# Patient Record
Sex: FEMALE | Race: BLACK OR AFRICAN AMERICAN | NOT HISPANIC OR LATINO | Employment: FULL TIME | URBAN - METROPOLITAN AREA
[De-identification: names, ages, dates, MRNs, and addresses within clinical notes are randomized per-mention and may not be internally consistent; named-entity substitution may affect disease eponyms.]

---

## 2021-01-08 ENCOUNTER — ROUTINE PRENATAL (OUTPATIENT)
Dept: OBSTETRICS AND GYNECOLOGY | Facility: CLINIC | Age: 21
End: 2021-01-08

## 2021-01-08 VITALS
BODY MASS INDEX: 16.71 KG/M2 | DIASTOLIC BLOOD PRESSURE: 58 MMHG | SYSTOLIC BLOOD PRESSURE: 103 MMHG | WEIGHT: 104 LBS | HEIGHT: 66 IN

## 2021-01-08 DIAGNOSIS — Z34.90 PREGNANCY, UNSPECIFIED GESTATIONAL AGE: Primary | ICD-10-CM

## 2021-01-08 LAB
GLUCOSE UR STRIP-MCNC: NEGATIVE MG/DL
PROT UR STRIP-MCNC: NEGATIVE MG/DL

## 2021-01-08 PROCEDURE — 99213 OFFICE O/P EST LOW 20 MIN: CPT | Performed by: NURSE PRACTITIONER

## 2021-01-08 RX ORDER — PNV NO.95/FERROUS FUM/FOLIC AC 28MG-0.8MG
1 TABLET ORAL DAILY
COMMUNITY
Start: 2021-01-04 | End: 2021-02-03

## 2021-01-08 NOTE — PROGRESS NOTES
Initial ob visit     CC- Here for care of pregnancy     Rangel Ocampo is being seen today for her first obstetrical visit.  She is a 20 y.o.  [facility] denies complications with this pregnancy. Taking PNV daily. She has recently stopped smoking. LMP 20, GA 16+6 weeks, feeling flutters. FOB is involved. She reports she was positive for Covid 19 in 2020, states she received a phone call on Monday (21) [place] She states she has not come in sooner for pregnancy care because she was unaware that she was pregnant.    It was noted after pt visit was complete under Care Everywhere that she was actually diagnosed Covid + on 2021 and therefore she should currently be in quarantine.     # 1 - Date: 20, Sex: Male, Weight: None, GA: 39w0d, Delivery: Vaginal, Spontaneous, Apgar1: None, Apgar5: None, Living: None, Birth Comments: None    # 2 - Date: None, Sex: None, Weight: None, GA: None, Delivery: None, Apgar1: None, Apgar5: None, Living: None, Birth Comments: None      Current obstetric complaints : denies  Prior obstetric issues, potential pregnancy concerns: short interval between pregnancy  Family history of genetic issues (includes FOB): denies  Prior infections concerning in pregnancy (Rash, fever in last 2 weeks): Covid + 2021  Varicella Hx - denies  Prior testing for Cystic Fibrosis Carrier or Sickle Cell Trait- denies  Prepregnancy BMI - Body mass index is 16.79 kg/m².    Past Medical History:   Diagnosis Date   • Patient denies medical problems        Past Surgical History:   Procedure Laterality Date   • NO PAST SURGERIES           Current Outpatient Medications:   •  Prenatal Vit-Fe Fumarate-FA (prenatal vitamin 28-0.8) 28-0.8 MG tablet tablet, Take 1 tablet by mouth Daily., Disp: , Rfl:     No Known Allergies    Social hx:Single  FOB: Aristides Burgos    Family History   Problem Relation Age of Onset   • No Known Problems  "Father    • No Known Problems Mother    • Hypertension Maternal Uncle        Review of systems     Constitutional : positive nausea and vomiting   : Vaginal bleeding, cramping:denies  Breast Tenderness : positive  A comprehensive review of systems was negative.     Objective    /58   Ht 167.6 cm (66\")   Wt 47.2 kg (104 lb)   LMP 09/12/2020   BMI 16.79 kg/m²       General Appearance:    Alert, cooperative, in no acute distress   Head:    Normocephalic, without obvious abnormality, atraumatic   Eyes:            Lids and lashes normal, conjunctivae and sclerae normal, no   icterus, no pallor, corneas clear   Ears:    Ears appear intact with no abnormalities noted       Neck:   No adenopathy, supple, trachea midline, no thyromegaly   Back:     No kyphosis present, no scoliosis present,                       Lungs:     Clear to auscultation,respirations regular, even and     unlabored    Heart:    Regular rhythm and normal rate, normal S1 and S2, no            murmur, no gallop, no rub, no click   Breast Exam:    No masses, No nipple discharge   Abdomen:     Normal bowel sounds, no masses, no organomegaly, soft        non-tender, non-distended, no guarding, no rebound                 tenderness   Genitalia:    Vulva - BUS-WNL, NEFG    Vagina - No discharge, No bleeding    Cervix - No Lesions, closed     Uterus - Consistent with 16 weeks    Adnexa - No mass, NT    Pelvimetry - clinically adequate, gynecoid pelvis     Extremities:   Moves all extremities well, no edema, no cyanosis, no              redness   Pulses:   Pulses palpable and equal bilaterally   Skin:   No bleeding, bruising or rash   Lymph nodes:   No palpable adenopathy   Neurologic:   Sensation intact, A&O times 3      Assessment    1) Pregnancy at 16+6 weeks  Late onset Prenatal Care  Short interval between pregnancies  Prenatal panel, pap smear collected today  Encouraged daily PNV and folic acid  Anatomy scan at next appt in 2 weeks     " Plan    Initial labs collected  Ultrasound in 2 weeks  Cultures, both NuSwab and urine collected  Activity recommendation : 150 minutes/week of moderate intensity aerobic activity unless we limit for bleeding, hypertension or other pregnancy complication   Patient is on Prenatal vitamins  Problem list reviewed and updated.  Reviewed routine prenatal care with the office to include but not limited to   Zika (travel restrictions/ok to use insect repellant), not to changing cat litter, food restrictions, avoidance of alcohol, tobacco and drugs and saunas/hot tubs.   All questions answered.     Nicole Torrez, APRN  1/8/2021  12:09 EST

## 2021-01-09 LAB
ABO GROUP BLD: NORMAL
BASOPHILS # BLD AUTO: 0.1 X10E3/UL (ref 0–0.2)
BASOPHILS NFR BLD AUTO: 1 %
BLD GP AB SCN SERPL QL: NEGATIVE
EOSINOPHIL # BLD AUTO: 0.4 X10E3/UL (ref 0–0.4)
EOSINOPHIL NFR BLD AUTO: 5 %
ERYTHROCYTE [DISTWIDTH] IN BLOOD BY AUTOMATED COUNT: 12.3 % (ref 11.7–15.4)
HBA1C MFR BLD: 4.4 % (ref 4.8–5.6)
HBV SURFACE AG SERPL QL IA: NEGATIVE
HCT VFR BLD AUTO: 38.1 % (ref 34–46.6)
HCV AB S/CO SERPL IA: <0.1 S/CO RATIO (ref 0–0.9)
HGB BLD-MCNC: 13.1 G/DL (ref 11.1–15.9)
HIV 1+2 AB+HIV1 P24 AG SERPL QL IA: NON REACTIVE
IMM GRANULOCYTES # BLD AUTO: 0 X10E3/UL (ref 0–0.1)
IMM GRANULOCYTES NFR BLD AUTO: 1 %
LYMPHOCYTES # BLD AUTO: 1.6 X10E3/UL (ref 0.7–3.1)
LYMPHOCYTES NFR BLD AUTO: 25 %
MCH RBC QN AUTO: 31.8 PG (ref 26.6–33)
MCHC RBC AUTO-ENTMCNC: 34.4 G/DL (ref 31.5–35.7)
MCV RBC AUTO: 93 FL (ref 79–97)
MONOCYTES # BLD AUTO: 0.4 X10E3/UL (ref 0.1–0.9)
MONOCYTES NFR BLD AUTO: 6 %
NEUTROPHILS # BLD AUTO: 4.1 X10E3/UL (ref 1.4–7)
NEUTROPHILS NFR BLD AUTO: 62 %
PLATELET # BLD AUTO: 238 X10E3/UL (ref 150–450)
RBC # BLD AUTO: 4.12 X10E6/UL (ref 3.77–5.28)
RH BLD: POSITIVE
RPR SER QL: NON REACTIVE
RUBV IGG SERPL IA-ACNC: 4.45 INDEX
WBC # BLD AUTO: 6.7 X10E3/UL (ref 3.4–10.8)

## 2021-01-10 LAB
BACTERIA UR CULT: NO GROWTH
BACTERIA UR CULT: NORMAL

## 2021-01-12 LAB
CONV .: ABNORMAL
CYTOLOGIST CVX/VAG CYTO: ABNORMAL
CYTOLOGY CVX/VAG DOC CYTO: ABNORMAL
CYTOLOGY CVX/VAG DOC THIN PREP: ABNORMAL
DX ICD CODE: ABNORMAL
DX ICD CODE: ABNORMAL
HIV 1 & 2 AB SER-IMP: ABNORMAL
OTHER STN SPEC: ABNORMAL
PATHOLOGIST CVX/VAG CYTO: ABNORMAL
STAT OF ADQ CVX/VAG CYTO-IMP: ABNORMAL

## 2021-01-13 LAB
A VAGINAE DNA VAG QL NAA+PROBE: ABNORMAL SCORE
BVAB2 DNA VAG QL NAA+PROBE: ABNORMAL SCORE
C ALBICANS DNA VAG QL NAA+PROBE: POSITIVE
C GLABRATA DNA VAG QL NAA+PROBE: NEGATIVE
C TRACH DNA VAG QL NAA+PROBE: NEGATIVE
MEGA1 DNA VAG QL NAA+PROBE: ABNORMAL SCORE
N GONORRHOEA DNA VAG QL NAA+PROBE: NEGATIVE
T VAGINALIS DNA VAG QL NAA+PROBE: NEGATIVE

## 2021-01-14 NOTE — PROGRESS NOTES
Please let the pt know her vaginal cultures were positive for yeast. I have sent a medication to her pharmacy to treat. Thanks

## 2021-01-22 ENCOUNTER — ROUTINE PRENATAL (OUTPATIENT)
Dept: OBSTETRICS AND GYNECOLOGY | Facility: CLINIC | Age: 21
End: 2021-01-22

## 2021-01-22 VITALS — SYSTOLIC BLOOD PRESSURE: 100 MMHG | DIASTOLIC BLOOD PRESSURE: 59 MMHG | BODY MASS INDEX: 17.59 KG/M2 | WEIGHT: 109 LBS

## 2021-01-22 DIAGNOSIS — Z34.80 SUPERVISION OF OTHER NORMAL PREGNANCY, ANTEPARTUM: Primary | ICD-10-CM

## 2021-01-22 PROCEDURE — 99214 OFFICE O/P EST MOD 30 MIN: CPT | Performed by: OBSTETRICS & GYNECOLOGY

## 2021-01-22 NOTE — PROGRESS NOTES
Initial OB Visit    Chief Complaint   Patient presents with   • Routine Prenatal Visit        Rangel Ocampo is being seen today for her first obstetrical visit.  She is a 20 y.o.    18w6d gestation by LMP.  FOB: Aristides Burgos, boyfriend - aware and supportive.   This is not a planned pregnancy.   OB History    Para Term  AB Living   3 1 1 0 1 1   SAB TAB Ectopic Molar Multiple Live Births   1 0 0 0 0 1      # Outcome Date GA Lbr Han/2nd Weight Sex Delivery Anes PTL Lv   3 Current            2 Term 20 39w0d   M Vag-Spont None N    1 SAB                Current obstetric complaints: denies   Prior obstetric issues, potential pregnancy concerns: denies  Family history of genetic issues (includes FOB): denies  Prior infections concerning in pregnancy (Rash, fever since LMP): had COVID -symptoms started on .  Stopped having symtpoms on  and on  had a positive COVID test (but was asymptomatic aside from SOB)  Varicella Hx:immune by reported vaccination  Prior genetic testing: denies  History of abnormal pap smears: denies  History of STIs: gonorrhea - 3 to 4 years ago  History of HSV in self or partner? denies  Prepregnancy weight 100lbs    Past Medical History:   Diagnosis Date   • Gonorrhea    • Patient denies medical problems        Past Surgical History:   Procedure Laterality Date   • NO PAST SURGERIES           Current Outpatient Medications:   •  Prenatal Vit-Fe Fumarate-FA (prenatal vitamin 28-0.8) 28-0.8 MG tablet tablet, Take 1 tablet by mouth Daily., Disp: , Rfl:     No Known Allergies    Social History     Socioeconomic History   • Marital status: Single     Spouse name: Not on file   • Number of children: 1   • Years of education: 12   • Highest education level: Not on file   Tobacco Use   • Smoking status: Former Smoker   Substance and Sexual Activity   • Alcohol use: Not Currently   • Drug use: Not Currently   • Sexual activity: Yes     Partners: Male   Stopped  smoking in early January    Family History   Problem Relation Age of Onset   • No Known Problems Father    • No Known Problems Mother    • Hypertension Maternal Uncle    • Hypertension Maternal Aunt        Review of systems     Constitutional: negative for chills, fevers and negative for fatigue  Eyes: negative  Ears, nose, mouth, throat, and face: negative for hearing loss and nasal congestion  Respiratory: negative for asthma and wheezing  Cardiovascular: negative for chest pain and dyspnea  Gastrointestinal: negative for dyspepsia, dysphagia abdominal pain  Genitourinary:negative for urinary incontinence  Integument/breast: negative for breast lump  Hematologic/lymphatic: negative for bleeding  Musculoskeletal:negative for aches  Neurological: negative for numbness/tingling  Behavioral/Psych: negative for anhedonia  Allergic/Immunologic: negative for rash, allergy         Objective    /59   Wt 49.4 kg (109 lb)   LMP 09/12/2020   BMI 17.59 kg/m²       General Appearance:    Alert, cooperative, in no acute distress, habitus normal   Head:    Normocephalic, without obvious abnormality, atraumatic   Eyes:            Lids and lashes normal, conjunctivae and sclerae normal, no   icterus, no pallor, corneas clear   Ears:    Ears appear intact with no abnormalities noted       Neck:   No adenopathy, supple, trachea midline, no thyromegaly   Back:     No kyphosis present, no scoliosis present,                       Lungs:     Clear to auscultation,respirations regular, even and                   unlabored    Heart:    Regular rhythm and normal rate, normal S1 and S2, no            murmur, no gallop, no rub, no click   Breast Exam:    No masses, No nipple discharge   Abdomen:     Normal bowel sounds, no masses, no organomegaly, soft        non-tender, non-distended, no guarding, no rebound                 tenderness   Genitalia:    Vulva - BUS-WNL, NEFG    Vagina - No discharge, No bleeding    Cervix - No Lesions,  closed     Uterus - Consistent with 18 weeks    Adnexa - No masses, NT    Pelvimetry - clinically adequate, gynecoid pelvis     Extremities:   Moves all extremities well, no edema, no cyanosis, no              redness   Pulses:   Pulses palpable and equal bilaterally   Skin:   No bleeding, bruising or rash   Lymph nodes:   No palpable adenopathy   Neurologic:   Sensation intact, A&O times 3      Assessment  Pregnancy at 18w6d  H/o smoking  COVID in pregnancy  LSIL pap smear   Plan    Initial labs reviewed.  Reviewed LSIL pap smear and need for repeat in 1 year.    Patient is on Prenatal vitamins  Problem list reviewed and updated.  Reviewed routine prenatal care with the office to include but not limited to:   Zika (travel restrictions/ok to use insect repellant), not to changing cat litter, food restrictions, avoidance of alcohol, tobacco and drugs and saunas/hot tubs, anticipated weight gain/nutrition requirements.  Reviewed nature of practice and hospital.  Reviewed recommended follow up, importance of compliance with care. We reviewed testing in pregnancy including HIV testing and urine drug screen.    Reviewed aneuploidy screening and CF/SMA screening.  We reviewed limitations of testing, possibility of false positive/negative results, possible need for other tests as indicated.  Patient declines.  Declines hemoglobinopathy (reports no family history)  Counseled on limitations of ultrasound in pregnancy in detecting aneuploidy/fetal anomalies  Patient to schedule US at  for today or as available in the next 2 weeks with her schedule  Congratulated on smoking cessation  UDS sent today with patient's permission.     We reviewed that at this time, her BMI is classified as BMI < 18.5        Classification: underweight.  We reviewed that in pregnancy, her recommended weight gain is at least 35lbs.  In the first trimester, caloric demand is typically not increased.  In the second and third trimester, the  increased demand is approximately 350 and 450 calories respectively.    All questions answered.   No follow-ups on file.      Mary Oden MD

## 2021-01-26 LAB
AMPHETAMINES UR QL SCN: NEGATIVE NG/ML
BARBITURATES UR QL SCN: NEGATIVE NG/ML
BENZODIAZ UR QL: NEGATIVE NG/ML
BZE UR QL: NEGATIVE NG/ML
CANNABINOIDS UR CFM-MCNC: POSITIVE NG/ML
METHADONE UR QL SCN: NEGATIVE NG/ML
OPIATES UR QL: NEGATIVE NG/ML
PCP UR QL: NEGATIVE NG/ML
PROPOXYPH UR QL SCN: NEGATIVE NG/ML

## 2021-02-19 ENCOUNTER — ROUTINE PRENATAL (OUTPATIENT)
Dept: OBSTETRICS AND GYNECOLOGY | Facility: CLINIC | Age: 21
End: 2021-02-19

## 2021-02-19 VITALS — WEIGHT: 117 LBS | DIASTOLIC BLOOD PRESSURE: 64 MMHG | SYSTOLIC BLOOD PRESSURE: 105 MMHG | BODY MASS INDEX: 18.88 KG/M2

## 2021-02-19 DIAGNOSIS — Z3A.22 22 WEEKS GESTATION OF PREGNANCY: Primary | ICD-10-CM

## 2021-02-19 DIAGNOSIS — F12.90 MARIJUANA USE: ICD-10-CM

## 2021-02-19 LAB
GLUCOSE UR STRIP-MCNC: NEGATIVE MG/DL
PROT UR STRIP-MCNC: NEGATIVE MG/DL

## 2021-02-19 PROCEDURE — 99212 OFFICE O/P EST SF 10 MIN: CPT | Performed by: NURSE PRACTITIONER

## 2021-02-19 RX ORDER — HYDROGEN PEROXIDE 2.65 ML/100ML
LIQUID TOPICAL
COMMUNITY
Start: 2021-01-20 | End: 2021-03-19

## 2021-02-19 RX ORDER — PNV NO.95/FERROUS FUM/FOLIC AC 28MG-0.8MG
1 TABLET ORAL DAILY
COMMUNITY
Start: 2021-01-04

## 2021-02-19 NOTE — PROGRESS NOTES
OB follow up     Rangel Ocampo is a 20 y.o.  22w6d being seen today for her obstetrical visit.  Patient reports no complaints. Fetal movement: normal.    Her prenatal care is complicated by (and status): Late onset PNC    Review of Systems  Cramping/contractions: denies  Vaginal bleeding: denies  Fetal movement: normal    /64   Wt 53.1 kg (117 lb)   LMP 2020   BMI 18.88 kg/m²     FHT: 150 BPM   Uterine Size: 20 cm       Assessment/Plan    1) Pregnancy at 22w6d   Reviewed fetal kick counts  Reviewed this stage of pregnancy  UDS + THC, encouraged avoidance of use during pregnancy  Problem list updated   Follow up in 4 weeks    Nicole Torrez, APRN  2021  11:34 EST

## 2021-03-18 ENCOUNTER — TELEPHONE (OUTPATIENT)
Dept: OBSTETRICS AND GYNECOLOGY | Facility: CLINIC | Age: 21
End: 2021-03-18

## 2021-03-18 NOTE — TELEPHONE ENCOUNTER
I would not recommend alcohol in pregnancy. There is no known safe amount, so in general we recommend not consuming alcohol. Thanks!

## 2021-03-19 ENCOUNTER — ROUTINE PRENATAL (OUTPATIENT)
Dept: OBSTETRICS AND GYNECOLOGY | Facility: CLINIC | Age: 21
End: 2021-03-19

## 2021-03-19 VITALS — WEIGHT: 121 LBS | DIASTOLIC BLOOD PRESSURE: 60 MMHG | SYSTOLIC BLOOD PRESSURE: 103 MMHG | BODY MASS INDEX: 19.53 KG/M2

## 2021-03-19 DIAGNOSIS — Z34.80 SUPERVISION OF OTHER NORMAL PREGNANCY, ANTEPARTUM: Primary | ICD-10-CM

## 2021-03-19 PROCEDURE — 99213 OFFICE O/P EST LOW 20 MIN: CPT | Performed by: OBSTETRICS & GYNECOLOGY

## 2021-03-19 NOTE — PROGRESS NOTES
Chief Complaint   Patient presents with   • Routine Prenatal Visit      Rangel Ocampo is a 20 y.o.  at 26w6d   Denies any issues including no bleeding or LOF.   Fetal movement normal  Reports no contractions or cramping    /60   Wt 54.9 kg (121 lb)   LMP 2020   BMI 19.53 kg/m²    Gen: NAD, well appearing  Abd: nontender  See OB Flowsheet    ASSESSMENT:   1. IUP at 26w6d   2. THC use  PLAN:  Reviewed with patient that we recommend GCT, CBC in next 1-2 weeks and RTO for visit in four weeks. Pt agrees  Counseled on COVID vaccination  Continue PNV  Return in about 1 week (around 3/26/2021) for GCT, CBC.    Problem List Items Addressed This Visit     None      Visit Diagnoses     Supervision of other normal pregnancy, antepartum    -  Primary    Relevant Orders    Gestational Screen 1 Hr ([facility])    CBC (No Diff)          Orders Placed This Encounter   Procedures   • Gestational Screen 1 Hr ([facility])   • CBC (No Diff)

## 2021-04-21 ENCOUNTER — ROUTINE PRENATAL (OUTPATIENT)
Dept: OBSTETRICS AND GYNECOLOGY | Facility: CLINIC | Age: 21
End: 2021-04-21

## 2021-04-21 VITALS — BODY MASS INDEX: 20.66 KG/M2 | DIASTOLIC BLOOD PRESSURE: 65 MMHG | WEIGHT: 128 LBS | SYSTOLIC BLOOD PRESSURE: 113 MMHG

## 2021-04-21 DIAGNOSIS — O26.843 FUNDAL HEIGHT LOW FOR DATES IN THIRD TRIMESTER: Primary | ICD-10-CM

## 2021-04-21 DIAGNOSIS — Z3A.31 31 WEEKS GESTATION OF PREGNANCY: ICD-10-CM

## 2021-04-21 LAB
GLUCOSE UR STRIP-MCNC: NEGATIVE MG/DL
PROT UR STRIP-MCNC: NEGATIVE MG/DL

## 2021-04-21 PROCEDURE — 99213 OFFICE O/P EST LOW 20 MIN: CPT | Performed by: NURSE PRACTITIONER

## 2021-04-21 NOTE — PROGRESS NOTES
OB follow up     Rangel Ocampo is a 20 y.o.  31w4d being seen today for her obstetrical visit.  Patient reports occas uterine ctx. She denies any current ctx or cramping. Fetal movement: normal. She has not yet completed 1 hr GTT and declines to do so today    Review of Systems  Cramping/contractions: occasional  Vaginal bleeding: denies  Fetal movement: normal    /65   Wt 58.1 kg (128 lb)   LMP 2020   BMI 20.66 kg/m²     FHT: 150 BPM   Uterine Size: size less than dates       Assessment/Plan    1) Pregnancy at 31w4d   2) THC use  3) Size less than dates  Reviewed fetal kick counts  Reviewed S&S of PTL, encouraged to call or go to L&D if 4-6 ctx in one hour, decreased FM, or leakage of fluid  Reviewed this stage of pregnancy  Problem list updated   She she will complete 1 hr GTT next week, A1C 2021 was 4.4  Will check growth scan at next visit    Follow up in 2 weeks    I have spent 20 min in face to face time with the patient and 20 min of this time was spent in counseling on the above stated issues.    Nicole Torrez, APRN  2021  11:59 EDT

## 2021-05-03 ENCOUNTER — TELEPHONE (OUTPATIENT)
Dept: OBSTETRICS AND GYNECOLOGY | Facility: CLINIC | Age: 21
End: 2021-05-03

## 2021-05-03 NOTE — TELEPHONE ENCOUNTER
Pt was informed and wanted to know if she could get it done tomorrow when she comes in to see you?

## 2021-05-03 NOTE — TELEPHONE ENCOUNTER
Okay to get TB skin test. You can provide a note that the TB skin test is both safe and valid in pregnancy. Thanks!

## 2021-05-03 NOTE — TELEPHONE ENCOUNTER
Good Morning,   Pt called and wanted to know if it was okay for her to get a Tb test at her work? Or wait til she delivers.  And if so she said they need a consent form saying it is okay.   Please Advise,  Thanks Lamar

## 2021-05-04 ENCOUNTER — ROUTINE PRENATAL (OUTPATIENT)
Dept: OBSTETRICS AND GYNECOLOGY | Facility: CLINIC | Age: 21
End: 2021-05-04

## 2021-05-04 VITALS — WEIGHT: 128 LBS | SYSTOLIC BLOOD PRESSURE: 119 MMHG | BODY MASS INDEX: 20.66 KG/M2 | DIASTOLIC BLOOD PRESSURE: 71 MMHG

## 2021-05-04 DIAGNOSIS — O36.5931 IUGR (INTRAUTERINE GROWTH RESTRICTION) AFFECTING CARE OF MOTHER, THIRD TRIMESTER, FETUS 1: ICD-10-CM

## 2021-05-04 DIAGNOSIS — Z3A.33 33 WEEKS GESTATION OF PREGNANCY: Primary | ICD-10-CM

## 2021-05-04 LAB
GLUCOSE UR STRIP-MCNC: NEGATIVE MG/DL
PROT UR STRIP-MCNC: NEGATIVE MG/DL

## 2021-05-04 PROCEDURE — 99213 OFFICE O/P EST LOW 20 MIN: CPT | Performed by: OBSTETRICS & GYNECOLOGY

## 2021-05-04 NOTE — PROGRESS NOTES
No chief complaint on file.     Rangel Ocampo is a 20 y.o.  at 33w3d   Denies any issues including no bleeding or LOF.   She drank a caloric beverage right before her visit so declines GCT today.  Fetal movement normal    /71   Wt 58.1 kg (128 lb)   LMP 2020   BMI 20.66 kg/m²    Gen: NAD, well appearing  Abd: nontender  See OB Flowsheet    ASSESSMENT:   1. 33 weeks gestation of pregnancy    2. IUGR (intrauterine growth restriction) affecting care of mother, third trimester, fetus 1        PLAN:  Reviewed diagnosis of IUGR and risks including but not limited to stillbirth, need for IOL.  Recommend serial BPP (weekly) and growth US/doppler as indicated  Encouraged to do GCT, reports she will come back tomorrow.  Reviewed risks of diabetes in pregnancy  Declines Tdap.  Counseled on risks/benefits of vaccination and risks of pertussis to , option for vaccination after delivery  Reviewed common symptoms of the third trimester.  Counseled on labor precautions and anticipated fetal movements.  Reviewed kick counts.  Patient is aware of the location of L&D.     Return in about 1 day (around 2021) for GCT, CBC, 1 week for BPP, 2 weeks for BPP and OB visit.    Problem List Items Addressed This Visit        Other    IUGR (intrauterine growth restriction) affecting care of mother, third trimester, fetus 1    Relevant Orders    US Fetal Biophysical Profile;Without Non-Stress Testing    Drug Profile Urine - 9 Drugs - Urine, Clean Catch    US Fetal Biophysical Profile Without Non Stress Testing Additional      Other Visit Diagnoses     33 weeks gestation of pregnancy    -  Primary    Relevant Orders    POC Urinalysis Dipstick (Completed)          Orders Placed This Encounter   Procedures   • US Fetal Biophysical Profile;Without Non-Stress Testing     Order Specific Question:   Reason for Exam:     Answer:   IUGR   • US Fetal Biophysical Profile Without Non Stress Testing Additional     Standing  Status:   Future     Standing Expiration Date:   5/4/2022     Order Specific Question:   Reason for Exam:     Answer:   IUGR   • Drug Profile Urine - 9 Drugs - Urine, Clean Catch     Order Specific Question:   Release to patient     Answer:   Immediate   • POC Urinalysis Dipstick     Order Specific Question:   Release to patient     Answer:   Immediate

## 2021-05-05 LAB
AMPHETAMINES UR QL SCN: NEGATIVE NG/ML
BARBITURATES UR QL SCN: NEGATIVE NG/ML
BENZODIAZ UR QL: NEGATIVE NG/ML
BZE UR QL: NEGATIVE NG/ML
CANNABINOIDS UR QL SCN: NEGATIVE NG/ML
METHADONE UR QL SCN: NEGATIVE NG/ML
OPIATES UR QL: NEGATIVE NG/ML
PCP UR QL: NEGATIVE NG/ML
PROPOXYPH UR QL SCN: NEGATIVE NG/ML

## 2021-05-19 ENCOUNTER — ROUTINE PRENATAL (OUTPATIENT)
Dept: OBSTETRICS AND GYNECOLOGY | Facility: CLINIC | Age: 21
End: 2021-05-19

## 2021-05-19 VITALS — DIASTOLIC BLOOD PRESSURE: 57 MMHG | WEIGHT: 133 LBS | SYSTOLIC BLOOD PRESSURE: 101 MMHG | BODY MASS INDEX: 21.47 KG/M2

## 2021-05-19 DIAGNOSIS — Z3A.35 35 WEEKS GESTATION OF PREGNANCY: Primary | ICD-10-CM

## 2021-05-19 DIAGNOSIS — O36.5931 IUGR (INTRAUTERINE GROWTH RESTRICTION) AFFECTING CARE OF MOTHER, THIRD TRIMESTER, FETUS 1: ICD-10-CM

## 2021-05-19 LAB
GLUCOSE UR STRIP-MCNC: NEGATIVE MG/DL
PROT UR STRIP-MCNC: NEGATIVE MG/DL

## 2021-05-19 PROCEDURE — 99214 OFFICE O/P EST MOD 30 MIN: CPT | Performed by: NURSE PRACTITIONER

## 2021-05-19 NOTE — PROGRESS NOTES
OB follow up     Rangel Ocampo is a 20 y.o.  35w4d being seen today for her obstetrical visit.  Patient reports no complaints. Fetal movement: normal. She has not yet completed 1 hr GTT or CBC. Last OB visit 21    Review of Systems  Cramping/contractions: denies  Vaginal bleeding: denies  Fetal movement: normal    /57   Wt 60.3 kg (133 lb)   LMP 2020   BMI 21.47 kg/m²     FHT: 147 BPM   Uterine Size: size less than dates       Assessment/Plan    1) Pregnancy at 35w4d   2) IUGR (intrauterine growth restriction) affecting care of mother, third trimester, fetus 1    GBS collected today, noncompliant with appointment dates/times. Last visit 21  CBC and random glucose today. Pt has not yet completed 1 hr GTT. A1C 4.4 2021. On numerous occasions she states she will return the next day to complete, but has not. Encouraged to complete GTT, however will collect random glucose today  Reviewed fetal kick counts  Reviewed this stage of pregnancy  Problem list updated   BP  with LAN 10.86cm    Follow up in 1 week    I have spent 20 min in face to face time with the patient and 20 min of this time was spent in counseling on the above stated issues.    Nicole Torrez, APRN  2021  10:42 EDT

## 2021-05-20 ENCOUNTER — DOCUMENTATION (OUTPATIENT)
Dept: OBSTETRICS AND GYNECOLOGY | Facility: CLINIC | Age: 21
End: 2021-05-20

## 2021-05-20 LAB
BASOPHILS # BLD AUTO: 0.06 10*3/MM3 (ref 0–0.2)
BASOPHILS NFR BLD AUTO: 0.7 % (ref 0–1.5)
EOSINOPHIL # BLD AUTO: 0.2 10*3/MM3 (ref 0–0.4)
EOSINOPHIL NFR BLD AUTO: 2.3 % (ref 0.3–6.2)
ERYTHROCYTE [DISTWIDTH] IN BLOOD BY AUTOMATED COUNT: 11.9 % (ref 12.3–15.4)
GLUCOSE SERPL-MCNC: 94 MG/DL (ref 65–99)
HCT VFR BLD AUTO: 33.9 % (ref 34–46.6)
HGB BLD-MCNC: 11.8 G/DL (ref 12–15.9)
IMM GRANULOCYTES # BLD AUTO: 0.12 10*3/MM3 (ref 0–0.05)
IMM GRANULOCYTES NFR BLD AUTO: 1.4 % (ref 0–0.5)
LYMPHOCYTES # BLD AUTO: 1.85 10*3/MM3 (ref 0.7–3.1)
LYMPHOCYTES NFR BLD AUTO: 21.2 % (ref 19.6–45.3)
MCH RBC QN AUTO: 31.6 PG (ref 26.6–33)
MCHC RBC AUTO-ENTMCNC: 34.8 G/DL (ref 31.5–35.7)
MCV RBC AUTO: 90.6 FL (ref 79–97)
MONOCYTES # BLD AUTO: 0.77 10*3/MM3 (ref 0.1–0.9)
MONOCYTES NFR BLD AUTO: 8.8 % (ref 5–12)
NEUTROPHILS # BLD AUTO: 5.74 10*3/MM3 (ref 1.7–7)
NEUTROPHILS NFR BLD AUTO: 65.6 % (ref 42.7–76)
NRBC BLD AUTO-RTO: 0 /100 WBC (ref 0–0.2)
PLATELET # BLD AUTO: 185 10*3/MM3 (ref 140–450)
RBC # BLD AUTO: 3.74 10*6/MM3 (ref 3.77–5.28)
WBC # BLD AUTO: 8.74 10*3/MM3 (ref 3.4–10.8)

## 2021-05-23 LAB — B-HEM STREP SPEC QL CULT: NEGATIVE

## 2021-05-28 ENCOUNTER — ROUTINE PRENATAL (OUTPATIENT)
Dept: OBSTETRICS AND GYNECOLOGY | Facility: CLINIC | Age: 21
End: 2021-05-28

## 2021-05-28 VITALS — WEIGHT: 134 LBS | SYSTOLIC BLOOD PRESSURE: 114 MMHG | BODY MASS INDEX: 21.63 KG/M2 | DIASTOLIC BLOOD PRESSURE: 61 MMHG

## 2021-05-28 DIAGNOSIS — Z3A.36 36 WEEKS GESTATION OF PREGNANCY: Primary | ICD-10-CM

## 2021-05-28 DIAGNOSIS — O36.5931 IUGR (INTRAUTERINE GROWTH RESTRICTION) AFFECTING CARE OF MOTHER, THIRD TRIMESTER, FETUS 1: ICD-10-CM

## 2021-05-28 PROCEDURE — 99213 OFFICE O/P EST LOW 20 MIN: CPT | Performed by: NURSE PRACTITIONER

## 2021-05-28 NOTE — PROGRESS NOTES
OB follow up     Rangel Ocampo is a 20 y.o.  36w6d being seen today for her obstetrical visit.  Patient reports no complaints. Fetal movement: normal. Ultrasound completed today. She refuses SVE today. She is unable to leave a urine sample today.     She was 27 minutes late for today's appointment.     Review of Systems  Cramping/contractions: denies  Vaginal bleeding: denies  Fetal movement: normal    /61   Wt 60.8 kg (134 lb)   LMP 2020   BMI 21.63 kg/m²     FHT: 145 BPM   Uterine Size: size less than dates       Assessment/Plan    1) Pregnancy at 36w6d   2) IUGR (intrauterine growth restriction) affecting care of mother, third trimester    BPP 8/8, LAN 11.03cm,, S/D ratio 2.33. Will repeat in one week with Estimated fetal weight  Declined cervical exam today. Unable to leave a urine sample today  GBS negative  Reviewed CBC results with pt, no signs of anemia  She is planning DMPA for contraception postpartum  Reviewed fetal kick counts  Reviewed this stage of pregnancy  Problem list updated     Follow up in 1 week    I have spent 20 min in face to face time with the patient and 20 min of this time was spent in counseling on the above stated issues.    Nicole Torrez, APRN  2021  12:24 EDT

## 2021-06-04 ENCOUNTER — ROUTINE PRENATAL (OUTPATIENT)
Dept: OBSTETRICS AND GYNECOLOGY | Facility: CLINIC | Age: 21
End: 2021-06-04

## 2021-06-04 VITALS — BODY MASS INDEX: 22.27 KG/M2 | WEIGHT: 138 LBS | DIASTOLIC BLOOD PRESSURE: 71 MMHG | SYSTOLIC BLOOD PRESSURE: 114 MMHG

## 2021-06-04 DIAGNOSIS — Z3A.37 37 WEEKS GESTATION OF PREGNANCY: Primary | ICD-10-CM

## 2021-06-04 DIAGNOSIS — O36.5931 IUGR (INTRAUTERINE GROWTH RESTRICTION) AFFECTING CARE OF MOTHER, THIRD TRIMESTER, FETUS 1: ICD-10-CM

## 2021-06-04 PROCEDURE — 99213 OFFICE O/P EST LOW 20 MIN: CPT | Performed by: NURSE PRACTITIONER

## 2021-06-04 NOTE — PROGRESS NOTES
OB follow up     Rangel Ocampo is a 20 y.o.  37w6d being seen today for her obstetrical visit.  Patient reports no complaints. Fetal movement: normal. She is unable to provide a urine sample today    Review of Systems  Cramping/contractions: denies  Vaginal bleeding: denies  Fetal movement: normal    /71   Wt 62.6 kg (138 lb)   LMP 2020   BMI 22.27 kg/m²     FHT: 140s BPM   Uterine Size: size less than dates       Assessment/Plan    1) Pregnancy at 37w6d   2) IUGR (intrauterine growth restriction) affecting care of mother, third trimester  EFW 5lb 6 oz 4.7th percentile, HC <1%, AC 1.1%, LAN 11, BPP 8/8, S/D ratio 2.41 with no absent or end diastolic flow  Scheduled for IOL 6/10/2021 with Dr Oden  Will repeat BPP early next week  Reviewed S&S of labor  Reviewed fetal kick counts  Reviewed this stage of pregnancy  Problem list updated     Follow up in 1 week for IOL    I have spent 20 min in face to face time with the patient and 20 min of this time was spent in counseling on the above stated issues.    Nicole Torrez, APRN  2021  11:58 EDT

## 2021-06-07 ENCOUNTER — ROUTINE PRENATAL (OUTPATIENT)
Dept: OBSTETRICS AND GYNECOLOGY | Facility: CLINIC | Age: 21
End: 2021-06-07

## 2021-06-07 ENCOUNTER — TELEPHONE (OUTPATIENT)
Dept: OBSTETRICS AND GYNECOLOGY | Facility: CLINIC | Age: 21
End: 2021-06-07

## 2021-06-07 ENCOUNTER — HOSPITAL ENCOUNTER (INPATIENT)
Facility: HOSPITAL | Age: 21
LOS: 2 days | Discharge: HOME | End: 2021-06-09
Attending: OBSTETRICS & GYNECOLOGY | Admitting: OBSTETRICS & GYNECOLOGY

## 2021-06-07 VITALS — SYSTOLIC BLOOD PRESSURE: 124 MMHG | WEIGHT: 136 LBS | DIASTOLIC BLOOD PRESSURE: 80 MMHG | BODY MASS INDEX: 21.95 KG/M2

## 2021-06-07 DIAGNOSIS — O09.93 HIGH-RISK PREGNANCY IN THIRD TRIMESTER: Primary | ICD-10-CM

## 2021-06-07 DIAGNOSIS — O36.5931 IUGR (INTRAUTERINE GROWTH RESTRICTION) AFFECTING CARE OF MOTHER, THIRD TRIMESTER, FETUS 1: ICD-10-CM

## 2021-06-07 PROBLEM — Z34.90 PREGNANCY: Status: ACTIVE | Noted: 2021-06-07

## 2021-06-07 LAB
ABO GROUP BLD: NORMAL
AMPHET+METHAMPHET UR QL: NEGATIVE
BARBITURATES UR QL SCN: NEGATIVE
BENZODIAZ UR QL SCN: NEGATIVE
BLD GP AB SCN SERPL QL: NEGATIVE
CANNABINOIDS SERPL QL: NEGATIVE
COCAINE UR QL: NEGATIVE
DEPRECATED RDW RBC AUTO: 40.8 FL (ref 37–54)
ERYTHROCYTE [DISTWIDTH] IN BLOOD BY AUTOMATED COUNT: 12.7 % (ref 12.3–15.4)
HCT VFR BLD AUTO: 38.1 % (ref 34–46.6)
HGB BLD-MCNC: 13.4 G/DL (ref 12–15.9)
MCH RBC QN AUTO: 31.5 PG (ref 26.6–33)
MCHC RBC AUTO-ENTMCNC: 35.2 G/DL (ref 31.5–35.7)
MCV RBC AUTO: 89.4 FL (ref 79–97)
METHADONE UR QL SCN: NEGATIVE
OPIATES UR QL: NEGATIVE
OXYCODONE UR QL SCN: NEGATIVE
PLATELET # BLD AUTO: 169 10*3/MM3 (ref 140–450)
PMV BLD AUTO: 12 FL (ref 6–12)
RBC # BLD AUTO: 4.26 10*6/MM3 (ref 3.77–5.28)
RH BLD: POSITIVE
SARS-COV-2 RNA PNL SPEC NAA+PROBE: NOT DETECTED
T&S EXPIRATION DATE: NORMAL
WBC # BLD AUTO: 9.19 10*3/MM3 (ref 3.4–10.8)

## 2021-06-07 PROCEDURE — 86901 BLOOD TYPING SEROLOGIC RH(D): CPT | Performed by: OBSTETRICS & GYNECOLOGY

## 2021-06-07 PROCEDURE — 80307 DRUG TEST PRSMV CHEM ANLYZR: CPT | Performed by: OBSTETRICS & GYNECOLOGY

## 2021-06-07 PROCEDURE — 87635 SARS-COV-2 COVID-19 AMP PRB: CPT | Performed by: OBSTETRICS & GYNECOLOGY

## 2021-06-07 PROCEDURE — 85027 COMPLETE CBC AUTOMATED: CPT | Performed by: OBSTETRICS & GYNECOLOGY

## 2021-06-07 PROCEDURE — 99024 POSTOP FOLLOW-UP VISIT: CPT | Performed by: OBSTETRICS & GYNECOLOGY

## 2021-06-07 PROCEDURE — 86900 BLOOD TYPING SEROLOGIC ABO: CPT | Performed by: OBSTETRICS & GYNECOLOGY

## 2021-06-07 PROCEDURE — 59410 OBSTETRICAL CARE: CPT | Performed by: OBSTETRICS & GYNECOLOGY

## 2021-06-07 PROCEDURE — 0KQM0ZZ REPAIR PERINEUM MUSCLE, OPEN APPROACH: ICD-10-PCS | Performed by: OBSTETRICS & GYNECOLOGY

## 2021-06-07 PROCEDURE — S0260 H&P FOR SURGERY: HCPCS | Performed by: OBSTETRICS & GYNECOLOGY

## 2021-06-07 PROCEDURE — 88307 TISSUE EXAM BY PATHOLOGIST: CPT

## 2021-06-07 PROCEDURE — 86850 RBC ANTIBODY SCREEN: CPT | Performed by: OBSTETRICS & GYNECOLOGY

## 2021-06-07 RX ORDER — DOCUSATE SODIUM 100 MG/1
100 CAPSULE, LIQUID FILLED ORAL 2 TIMES DAILY
Status: DISCONTINUED | OUTPATIENT
Start: 2021-06-07 | End: 2021-06-09 | Stop reason: HOSPADM

## 2021-06-07 RX ORDER — SODIUM CHLORIDE 0.9 % (FLUSH) 0.9 %
1-10 SYRINGE (ML) INJECTION AS NEEDED
Status: DISCONTINUED | OUTPATIENT
Start: 2021-06-07 | End: 2021-06-09 | Stop reason: HOSPADM

## 2021-06-07 RX ORDER — MAGNESIUM CARB/ALUMINUM HYDROX 105-160MG
30 TABLET,CHEWABLE ORAL ONCE
Status: DISCONTINUED | OUTPATIENT
Start: 2021-06-07 | End: 2021-06-07 | Stop reason: HOSPADM

## 2021-06-07 RX ORDER — HYDROCORTISONE 25 MG/G
1 CREAM TOPICAL AS NEEDED
Status: DISCONTINUED | OUTPATIENT
Start: 2021-06-07 | End: 2021-06-09 | Stop reason: HOSPADM

## 2021-06-07 RX ORDER — PHYTONADIONE 1 MG/.5ML
INJECTION, EMULSION INTRAMUSCULAR; INTRAVENOUS; SUBCUTANEOUS
Status: DISPENSED
Start: 2021-06-07 | End: 2021-06-08

## 2021-06-07 RX ORDER — ERYTHROMYCIN 5 MG/G
OINTMENT OPHTHALMIC
Status: DISPENSED
Start: 2021-06-07 | End: 2021-06-08

## 2021-06-07 RX ORDER — BISACODYL 10 MG
10 SUPPOSITORY, RECTAL RECTAL DAILY PRN
Status: DISCONTINUED | OUTPATIENT
Start: 2021-06-08 | End: 2021-06-09 | Stop reason: HOSPADM

## 2021-06-07 RX ORDER — HYDROCODONE BITARTRATE AND ACETAMINOPHEN 5; 325 MG/1; MG/1
1 TABLET ORAL EVERY 4 HOURS PRN
Status: DISCONTINUED | OUTPATIENT
Start: 2021-06-07 | End: 2021-06-09 | Stop reason: HOSPADM

## 2021-06-07 RX ORDER — MISOPROSTOL 200 UG/1
600 TABLET ORAL ONCE
Status: DISCONTINUED | OUTPATIENT
Start: 2021-06-07 | End: 2021-06-09 | Stop reason: HOSPADM

## 2021-06-07 RX ORDER — LIDOCAINE HYDROCHLORIDE 10 MG/ML
10 INJECTION, SOLUTION INFILTRATION; PERINEURAL ONCE
Status: DISCONTINUED | OUTPATIENT
Start: 2021-06-07 | End: 2021-06-09 | Stop reason: HOSPADM

## 2021-06-07 RX ORDER — SODIUM CHLORIDE, SODIUM LACTATE, POTASSIUM CHLORIDE, CALCIUM CHLORIDE 600; 310; 30; 20 MG/100ML; MG/100ML; MG/100ML; MG/100ML
125 INJECTION, SOLUTION INTRAVENOUS CONTINUOUS
Status: DISCONTINUED | OUTPATIENT
Start: 2021-06-07 | End: 2021-06-09 | Stop reason: HOSPADM

## 2021-06-07 RX ORDER — ONDANSETRON 4 MG/1
4 TABLET, FILM COATED ORAL EVERY 6 HOURS PRN
Status: DISCONTINUED | OUTPATIENT
Start: 2021-06-07 | End: 2021-06-07 | Stop reason: HOSPADM

## 2021-06-07 RX ORDER — OXYTOCIN-SODIUM CHLORIDE 0.9% IV SOLN 30 UNIT/500ML 30-0.9/5 UT/ML-%
125 SOLUTION INTRAVENOUS CONTINUOUS PRN
Status: COMPLETED | OUTPATIENT
Start: 2021-06-07 | End: 2021-06-07

## 2021-06-07 RX ORDER — OXYTOCIN-SODIUM CHLORIDE 0.9% IV SOLN 30 UNIT/500ML 30-0.9/5 UT/ML-%
250 SOLUTION INTRAVENOUS CONTINUOUS PRN
Status: ACTIVE | OUTPATIENT
Start: 2021-06-07 | End: 2021-06-07

## 2021-06-07 RX ORDER — SODIUM CHLORIDE 0.9 % (FLUSH) 0.9 %
3 SYRINGE (ML) INJECTION EVERY 12 HOURS SCHEDULED
Status: DISCONTINUED | OUTPATIENT
Start: 2021-06-07 | End: 2021-06-07 | Stop reason: HOSPADM

## 2021-06-07 RX ORDER — ONDANSETRON 2 MG/ML
4 INJECTION INTRAMUSCULAR; INTRAVENOUS ONCE AS NEEDED
Status: DISCONTINUED | OUTPATIENT
Start: 2021-06-07 | End: 2021-06-07 | Stop reason: HOSPADM

## 2021-06-07 RX ORDER — FAMOTIDINE 10 MG/ML
20 INJECTION, SOLUTION INTRAVENOUS ONCE AS NEEDED
Status: DISCONTINUED | OUTPATIENT
Start: 2021-06-07 | End: 2021-06-07 | Stop reason: HOSPADM

## 2021-06-07 RX ORDER — DIPHENHYDRAMINE HCL 25 MG
25 CAPSULE ORAL NIGHTLY PRN
Status: DISCONTINUED | OUTPATIENT
Start: 2021-06-07 | End: 2021-06-09 | Stop reason: HOSPADM

## 2021-06-07 RX ORDER — ACETAMINOPHEN 325 MG/1
650 TABLET ORAL EVERY 4 HOURS PRN
Status: DISCONTINUED | OUTPATIENT
Start: 2021-06-07 | End: 2021-06-09 | Stop reason: HOSPADM

## 2021-06-07 RX ORDER — ONDANSETRON 2 MG/ML
4 INJECTION INTRAMUSCULAR; INTRAVENOUS EVERY 6 HOURS PRN
Status: DISCONTINUED | OUTPATIENT
Start: 2021-06-07 | End: 2021-06-07 | Stop reason: HOSPADM

## 2021-06-07 RX ORDER — METHYLERGONOVINE MALEATE 0.2 MG/ML
200 INJECTION INTRAVENOUS ONCE AS NEEDED
Status: DISCONTINUED | OUTPATIENT
Start: 2021-06-07 | End: 2021-06-07 | Stop reason: HOSPADM

## 2021-06-07 RX ORDER — EPHEDRINE SULFATE 50 MG/ML
5 INJECTION, SOLUTION INTRAVENOUS
Status: DISCONTINUED | OUTPATIENT
Start: 2021-06-07 | End: 2021-06-07 | Stop reason: HOSPADM

## 2021-06-07 RX ORDER — OXYTOCIN-SODIUM CHLORIDE 0.9% IV SOLN 30 UNIT/500ML 30-0.9/5 UT/ML-%
SOLUTION INTRAVENOUS
Status: COMPLETED
Start: 2021-06-07 | End: 2021-06-07

## 2021-06-07 RX ORDER — PRENATAL VIT/IRON FUM/FOLIC AC 27MG-0.8MG
1 TABLET ORAL DAILY
Status: DISCONTINUED | OUTPATIENT
Start: 2021-06-07 | End: 2021-06-09 | Stop reason: HOSPADM

## 2021-06-07 RX ORDER — SODIUM CHLORIDE 0.9 % (FLUSH) 0.9 %
10 SYRINGE (ML) INJECTION AS NEEDED
Status: DISCONTINUED | OUTPATIENT
Start: 2021-06-07 | End: 2021-06-07 | Stop reason: HOSPADM

## 2021-06-07 RX ORDER — IBUPROFEN 800 MG/1
800 TABLET ORAL EVERY 8 HOURS PRN
Status: DISCONTINUED | OUTPATIENT
Start: 2021-06-07 | End: 2021-06-09 | Stop reason: HOSPADM

## 2021-06-07 RX ORDER — CALCIUM CARBONATE 200(500)MG
2 TABLET,CHEWABLE ORAL 3 TIMES DAILY PRN
Status: DISCONTINUED | OUTPATIENT
Start: 2021-06-07 | End: 2021-06-09 | Stop reason: HOSPADM

## 2021-06-07 RX ORDER — DIPHENHYDRAMINE HYDROCHLORIDE 50 MG/ML
12.5 INJECTION INTRAMUSCULAR; INTRAVENOUS EVERY 8 HOURS PRN
Status: DISCONTINUED | OUTPATIENT
Start: 2021-06-07 | End: 2021-06-07 | Stop reason: HOSPADM

## 2021-06-07 RX ORDER — OXYTOCIN-SODIUM CHLORIDE 0.9% IV SOLN 30 UNIT/500ML 30-0.9/5 UT/ML-%
999 SOLUTION INTRAVENOUS ONCE
Status: COMPLETED | OUTPATIENT
Start: 2021-06-07 | End: 2021-06-07

## 2021-06-07 RX ORDER — LIDOCAINE HYDROCHLORIDE 10 MG/ML
5 INJECTION, SOLUTION EPIDURAL; INFILTRATION; INTRACAUDAL; PERINEURAL AS NEEDED
Status: DISCONTINUED | OUTPATIENT
Start: 2021-06-07 | End: 2021-06-07 | Stop reason: HOSPADM

## 2021-06-07 RX ORDER — CARBOPROST TROMETHAMINE 250 UG/ML
250 INJECTION, SOLUTION INTRAMUSCULAR AS NEEDED
Status: DISCONTINUED | OUTPATIENT
Start: 2021-06-07 | End: 2021-06-07 | Stop reason: HOSPADM

## 2021-06-07 RX ORDER — MISOPROSTOL 200 UG/1
800 TABLET ORAL AS NEEDED
Status: DISCONTINUED | OUTPATIENT
Start: 2021-06-07 | End: 2021-06-07 | Stop reason: HOSPADM

## 2021-06-07 RX ADMIN — OXYTOCIN 125 ML/HR: 10 INJECTION INTRAVENOUS at 17:43

## 2021-06-07 RX ADMIN — HYDROCODONE BITARTRATE AND ACETAMINOPHEN 1 TABLET: 5; 325 TABLET ORAL at 17:40

## 2021-06-07 RX ADMIN — PRENATAL VITAMINS-IRON FUMARATE 27 MG IRON-FOLIC ACID 0.8 MG TABLET 1 TABLET: at 21:57

## 2021-06-07 RX ADMIN — DOCUSATE SODIUM 100 MG: 100 CAPSULE, LIQUID FILLED ORAL at 21:57

## 2021-06-07 RX ADMIN — ACETAMINOPHEN 650 MG: 325 TABLET, FILM COATED ORAL at 16:58

## 2021-06-07 RX ADMIN — SODIUM CHLORIDE, POTASSIUM CHLORIDE, SODIUM LACTATE AND CALCIUM CHLORIDE 125 ML/HR: 600; 310; 30; 20 INJECTION, SOLUTION INTRAVENOUS at 15:38

## 2021-06-07 RX ADMIN — LIDOCAINE HYDROCHLORIDE 5 ML: 10 INJECTION, SOLUTION EPIDURAL; INFILTRATION; INTRACAUDAL; PERINEURAL at 16:07

## 2021-06-07 RX ADMIN — OXYTOCIN 999 ML/HR: 10 INJECTION INTRAVENOUS at 16:10

## 2021-06-07 RX ADMIN — OXYTOCIN-SODIUM CHLORIDE 0.9% IV SOLN 30 UNIT/500ML 999 ML/HR: 30-0.9/5 SOLUTION at 16:10

## 2021-06-07 NOTE — H&P
[facility]  Obstetric History and Physical    Chief Complaint   Patient presents with   • Laboring      at 38.2 presents for term labor       Subjective     Patient is a 20 y.o. female  currently at 38w2d, who presents with active labor after being evaluated in the office and noted to be 5 cm with painful contractions..    Her prenatal care is complicated by  abnormal fetal growth  IUGR.  Her previous obstetric/gynecological history is noted for is non-contributory.    The following portions of the patients history were reviewed and updated as appropriate: current medications, allergies, past medical history, past surgical history, past family history, past social history and problem list .       Prenatal Information:  Prenatal Results     POC Urine Glucose/Protein     Test Value Reference Range Date Time    Urine Glucose ^ Negative  Negative, 1000 mg/dL (3+) 21     Urine Protein ^ Negative  Negative 21           Initial Prenatal Labs     Test Value Reference Range Date Time    Hemoglobin  13.1 g/dL 11.1 - 15.9 21 1315      ^ 11.6 g/dL 12.0 - 16.0 21 0052    Hematocrit  38.1 % 34.0 - 46.6 21 1315      ^ 32.7 % 36.0 - 46.0 21 0052    Platelets  169 10*3/mm3 140 - 450 21 1536       185 10*3/mm3 140 - 450 21 1103       238 x10E3/uL 150 - 450 21 1315      ^ 193 10*3/uL 140 - 440 21 0052    Rubella IgG  4.45 index Immune >0.99 21 1315    Hepatitis B SAg  Negative  Negative 21 1315    Hepatitis C Ab  <0.1 s/co ratio 0.0 - 0.9 21 1315    RPR  Non Reactive  Non Reactive 21 1315    ABO  O   21 1315    Rh  Positive   21 1315    Antibody Screen  Negative  Negative 21 1315    HIV  Non Reactive  Non Reactive 21 1315      ^ Nonreactive  Nonreactive 21 0052    Urine Culture  Final report   21 0155    Gonorrhea  Negative  Negative 21 0152    Chlamydia  Negative  Negative 21 0152    TSH               2nd and 3rd Trimester     Test Value Reference Range Date Time    Hemoglobin (repeated)  13.4 g/dL 12.0 - 15.9 06/07/21 1536       11.8 g/dL 12.0 - 15.9 05/19/21 1103    Hematocrit (repeated)  38.1 % 34.0 - 46.6 06/07/21 1536       33.9 % 34.0 - 46.6 05/19/21 1103    GCT        Antibody Screen (repeated)        GTT Fasting        GTT 1 Hr        GTT 2 Hr        GTT 3 Hr        Group B Strep  Negative  Negative 05/19/21 1106          Drug Screening     Test Value Reference Range Date Time    Amphetamine Screen  Negative ng/mL Fckgud=8873 05/04/21 1157       Negative ng/mL Crbnyk=5473 01/22/21 0116    Barbiturate Screen  Negative ng/mL Xrvcez=107 05/04/21 1157       Negative ng/mL Eidtfm=514 01/22/21 0116    Benzodiazepine Screen  Negative ng/mL Oqtfqv=127 05/04/21 1157       Negative ng/mL Kmwydu=441 01/22/21 0116    Methadone Screen  Negative ng/mL Evruhs=282 05/04/21 1157       Negative ng/mL Gdbcvn=224 01/22/21 0116    Phencyclidine Screen  Negative ng/mL Cutoff=25 05/04/21 1157       Negative ng/mL Cutoff=25 01/22/21 0116    Opiates Screen  Negative ng/mL Yeiyda=334 05/04/21 1157       Negative ng/mL Uecbpm=910 01/22/21 0116    THC Screen  Negative ng/mL Cutoff=50 05/04/21 1157       Positive  Cutoff=50 01/22/21 0116    Cocaine Screen  Negative ng/mL Vfjgeg=715 05/04/21 1157       Negative ng/mL Ndbddt=309 01/22/21 0116    Propoxyphene Screen  Negative ng/mL Xaxrci=153 05/04/21 1157       Negative ng/mL Ywogmn=823 01/22/21 0116    Buprenorphine Screen        Methamphetamine Screen        Oxycodone Screen        Tricyclic Antidepressants Screen              Other (Risk screening)     Test Value Reference Range Date Time    Varicella IgG        Parvovirus IgG        CMV IgG        Cystic Fibrosis        Hemoglobin electrophoresis        NIPT        MSAFP-4        AFP (for NTD only)              Legend    ^: Historical                      External Prenatal Results     Pregnancy Outside Results -  Transcribed From Office Records - See Scanned Records For Details     Test Value Date Time    ABO  O  01/08/21 1315    Rh  Positive  01/08/21 1315    Antibody Screen  Negative  01/08/21 1315    Varicella IgG ^ 2.6 AI 09/19/19 1507    Rubella  4.45 index 01/08/21 1315    Hgb  13.4 g/dL 06/07/21 1536       11.8 g/dL 05/19/21 1103       13.1 g/dL 01/08/21 1315      ^ 11.6 g/dL 01/04/21 0052    Hct  38.1 % 06/07/21 1536       33.9 % 05/19/21 1103       38.1 % 01/08/21 1315      ^ 32.7 % 01/04/21 0052    Glucose Fasting GTT       Glucose Tolerance Test 1 hour       Glucose Tolerance Test 3 hour       Gonorrhea (discrete)  Negative  01/08/21 0152    Chlamydia (discrete)  Negative  01/08/21 0152    RPR  Non Reactive  01/08/21 1315    VDRL       Syphilis Antibody ^ <0.2 AI 09/19/19 1507    HBsAg  Negative  01/08/21 1315    Herpes Simplex Virus PCR       Herpes Simplex VIrus Culture       HIV  Non Reactive  01/08/21 1315      ^ Nonreactive  01/04/21 0052    Hep C RNA Quant PCR       Hep C Antibody  <0.1 s/co ratio 01/08/21 1315    AFP       Group B Strep  Negative  05/19/21 1106    GBS Susceptibility to Clindamycin       GBS Susceptibility to Erythromycin       Fetal Fibronectin       Genetic Testing, Maternal Blood             Drug Screening     Test Value Date Time    Urine Drug Screen       Amphetamine Screen  Negative ng/mL 05/04/21 1157       Negative ng/mL 01/22/21 0116    Barbiturate Screen  Negative ng/mL 05/04/21 1157       Negative ng/mL 01/22/21 0116    Benzodiazepine Screen  Negative ng/mL 05/04/21 1157       Negative ng/mL 01/22/21 0116    Methadone Screen  Negative ng/mL 05/04/21 1157       Negative ng/mL 01/22/21 0116    Phencyclidine Screen  Negative ng/mL 05/04/21 1157       Negative ng/mL 01/22/21 0116    Opiates Screen ^ Negative  06/04/20 2119    THC Screen       Cocaine Screen       Propoxyphene Screen  Negative ng/mL 05/04/21 1157       Negative ng/mL 01/22/21 0116    Buprenorphine Screen        Methamphetamine Screen       Oxycodone Screen       Tricyclic Antidepressants Screen             Legend    ^: Historical                         Past OB History:     OB History    Para Term  AB Living   3 1 1 0 1 1   SAB TAB Ectopic Molar Multiple Live Births   1 0 0 0 0 1      # Outcome Date GA Lbr Han/2nd Weight Sex Delivery Anes PTL Lv   3 Current            2 Term 20 39w0d   M Vag-Spont None N    1 SAB                Past Medical History: Past Medical History:   Diagnosis Date   • Gonorrhea    • Patient denies medical problems       Past Surgical History Past Surgical History:   Procedure Laterality Date   • NO PAST SURGERIES        Family History: Family History   Problem Relation Age of Onset   • No Known Problems Father    • No Known Problems Mother    • Hypertension Maternal Uncle    • Hypertension Maternal Aunt       Social History:  reports that she has quit smoking. She does not have any smokeless tobacco history on file.   reports previous alcohol use.   reports current drug use. Drug: Marijuana.        General ROS: All systems were reviewed and negative except for:  Gastrointestinal: positive for  Abdominal pain with regular contractions    Objective       Vital Signs Range for the last 24 hours  Temperature: Temp:  [98.6 °F (37 °C)] 98.6 °F (37 °C)   Temp Source: Temp src: Oral   BP: BP: (124)/(80) 124/80   Pulse: Heart Rate:  [87] 87   Respirations: Resp:  [18] 18   SPO2: SpO2:  [100 %] 100 %   O2 Amount (l/min):     O2 Devices Device (Oxygen Therapy): room air   Weight: Weight:  [61.7 kg (136 lb)] 61.7 kg (136 lb)     Physical Examination: General appearance - alert, well appearing, and in no distress  Mental status - alert, oriented to person, place, and time, normal mood, behavior, speech, dress, motor activity, and thought processes  Heart - normal rate, regular rhythm, normal S1, S2, no murmurs, rubs, clicks or gallops  Abdomen - soft, nontender, nondistended, no masses or  organomegaly  Extremities - peripheral pulses normal, no pedal edema, no clubbing or cyanosis    Presentation:  Cephalic   Cervix: Exam by: Method: sterile exam per RN   Dilation: Cervical Dilation (cm): 10   Effacement: Cervical Effacement: 100%   Station:         Fetal Heart Rate Assessment   Method:     Beats/min:     Baseline:     Variability:     Accels:     Decels:     Tracing Category:       Uterine Assessment   Method:     Frequency (min):     Ctx Count in 10 min:     Duration:     Intensity:     Intensity by IUPC:     Resting Tone:     Resting Tone by IUPC:     [place] Units:       Laboratory Results: Pending  Radiology Review: No new studies  Other Studies: Negative    Assessment/Plan       Pregnancy        Assessment:  1.  Intrauterine pregnancy at 38w2d gestation with reactive, reassuring fetal status.    2.  labor  without ROM  3.  Obstetrical history significant for is remarkable for Intrauterine growth restriction  4.  GBS status:   Strep Gp B Culture   Date Value Ref Range Status   2021 Negative Negative Final     Comment:     Centers for Disease Control and Prevention (CDC) and American Congress  of Obstetricians and Gynecologists (ACOG) guidelines for prevention of   group B streptococcal (GBS) disease specify co-collection of  a vaginal and rectal swab specimen to maximize sensitivity of GBS  detection. Per the CDC and ACOG, swabbing both the lower vagina and  rectum substantially increases the yield of detection compared with  sampling the vagina alone.  Penicillin G, ampicillin, or cefazolin are indicated for intrapartum  prophylaxis of  GBS colonization. Reflex susceptibility  testing should be performed prior to use of clindamycin only on GBS  isolates from penicillin-allergic women who are considered a high risk  for anaphylaxis. Treatment with vancomycin without additional testing  is warranted if resistance to clindamycin is noted.         Plan:  1. Vaginal  anticipated  2. Plan of care has been reviewed with patient and she agrees.  She desires no epidural.  3.  Risks, benefits of treatment plan have been discussed.  4.  All questions have been answered.  5.  .      Noel Bui MD  6/7/2021  16:22 EDT

## 2021-06-07 NOTE — PLAN OF CARE
Goal Outcome Evaluation:  Plan of Care Reviewed With: patient, significant other        Progress: improving  Outcome Summary: anticipate transfer to mother baby unit s/p vaginal delivery

## 2021-06-07 NOTE — TELEPHONE ENCOUNTER
Good morning,  Patient is calling because when she is laying down she is having contractions that are 4 minutes apart and when she gets up they are 10 minutes apart. Patient stated that her water has not broken. Patient stated that she is supposed to be induced on Thursday and doesn't think that she can wait that long. Patient would like to know if she should come in today to be seen or go to L&D.      Please advise,  Thank you

## 2021-06-07 NOTE — PROGRESS NOTES
Ob follow up    Rangel Ocampo is a 20 y.o.  38w2d patient being seen today for her obstetrical visit. Patient reports contractions since yesterday. . Fetal movement: normal.    Her prenatal care is complicated by (and status) : IUGR       ROS -   Fetal Movement good   Vaginal bleeding none   Cramping/Contractions regular      /80   Wt 61.7 kg (136 lb)   LMP 2020   BMI 21.95 kg/m²     FHT:  156 BPM    Uterine Size: 32 cm   Presentations: cephalic   Pelvic Exam:     Dilation: 5 cm    Effacement: 75%    Station:  -1                 Assessment    Diagnoses and all orders for this visit:    1. High-risk pregnancy in third trimester (Primary)    2. IUGR (intrauterine growth restriction) affecting care of mother, third trimester, fetus 1        1) Pregnancy at 38w2d  2) Fetal status reassuring   3) GBS status - negative  4) IUGR  Now with evidence of labor - called L&D and sent in...         Plan    Labor warnings   [facility] BID        Kulwinder Guajardo MD   2021  14:09 EDT

## 2021-06-07 NOTE — L&D DELIVERY NOTE
[facility]  Vaginal Delivery Note    Delivery     Delivery: Vaginal, Spontaneous     YOB: 2022    Time of Birth:  Gestational Age 4:09 PM   38w2d     Anesthesia: None     Delivering clinician: Noel Bui    Forceps?   No   Vacuum? No    Shoulder dystocia present: No        Delivery narrative: Patient was admitted and rapidly went from 5 cm to rupture membranes to complete and +2.  I was called and came in.  Over the period of the next 3 contractions the patient pushed effectively and crowned.  1% lidocaine was used on the perineum.  Delivery then occurs left occiput anterior with nuchal cord x1 reduced.  Anterior shoulder was then easily cleared and the baby placed on maternal abdomen for 30 seconds prior to cord clamping.  Cord blood was then obtained and placenta delivered promptly with gentle massage of the uterus and gentle traction of the cord.  Uterus was firm and a small spontaneous second-degree laceration repaired with 3-0 Vicryl Rapide.    Infant    Findings: female  infant     Infant observations: Weight: 2815 g (6 lb 3.3 oz)   Length: 18  in  Observations/Comments:  scale 1      Apgars: 8  @ 1 minute /    9  @ 5 minutes   Infant Name: .     Placenta, Cord, and Fluid    Placenta delivered  Spontaneous  at   6/7/2021  4:10 PM     Cord: 3 vessels  present.   Nuchal Cord?  yes; Number of nuchal loops present:      Cord blood obtained: Yes    Cord gases obtained:  No    Cord gas results: Venous:  No results found for: PHCVEN    Arterial:  No results found for: PHCART     Repair    Episiotomy: Not recorded     Yes  Suture used for repair: 2-0 Vicryl    Lacerations: No   Estimated Blood Loss:             Complications  Intrauterine growth restriction, positive marijuana use    Disposition  Mother to Mother Baby/Postpartum  in stable condition currently.  Baby to remains with mom  in stable condition currently.      Noel Bui MD  06/07/21  16:25 EDT

## 2021-06-08 LAB
BASOPHILS # BLD AUTO: 0.06 10*3/MM3 (ref 0–0.2)
BASOPHILS NFR BLD AUTO: 0.6 % (ref 0–1.5)
BUPRENORPHINE UR QL: NEGATIVE NG/ML
DEPRECATED RDW RBC AUTO: 42 FL (ref 37–54)
EOSINOPHIL # BLD AUTO: 0.24 10*3/MM3 (ref 0–0.4)
EOSINOPHIL NFR BLD AUTO: 2.3 % (ref 0.3–6.2)
ERYTHROCYTE [DISTWIDTH] IN BLOOD BY AUTOMATED COUNT: 12.9 % (ref 12.3–15.4)
HCT VFR BLD AUTO: 32.6 % (ref 34–46.6)
HGB BLD-MCNC: 11.2 G/DL (ref 12–15.9)
IMM GRANULOCYTES # BLD AUTO: 0.07 10*3/MM3 (ref 0–0.05)
IMM GRANULOCYTES NFR BLD AUTO: 0.7 % (ref 0–0.5)
LYMPHOCYTES # BLD AUTO: 2.18 10*3/MM3 (ref 0.7–3.1)
LYMPHOCYTES NFR BLD AUTO: 20.7 % (ref 19.6–45.3)
MCH RBC QN AUTO: 31.3 PG (ref 26.6–33)
MCHC RBC AUTO-ENTMCNC: 34.4 G/DL (ref 31.5–35.7)
MCV RBC AUTO: 91.1 FL (ref 79–97)
MONOCYTES # BLD AUTO: 0.79 10*3/MM3 (ref 0.1–0.9)
MONOCYTES NFR BLD AUTO: 7.5 % (ref 5–12)
NEUTROPHILS NFR BLD AUTO: 68.2 % (ref 42.7–76)
NEUTROPHILS NFR BLD AUTO: 7.18 10*3/MM3 (ref 1.7–7)
NRBC BLD AUTO-RTO: 0 /100 WBC (ref 0–0.2)
PLATELET # BLD AUTO: 136 10*3/MM3 (ref 140–450)
PMV BLD AUTO: 12 FL (ref 6–12)
RBC # BLD AUTO: 3.58 10*6/MM3 (ref 3.77–5.28)
WBC # BLD AUTO: 10.52 10*3/MM3 (ref 3.4–10.8)

## 2021-06-08 PROCEDURE — 0503F POSTPARTUM CARE VISIT: CPT | Performed by: OBSTETRICS & GYNECOLOGY

## 2021-06-08 PROCEDURE — 85025 COMPLETE CBC W/AUTO DIFF WBC: CPT | Performed by: OBSTETRICS & GYNECOLOGY

## 2021-06-08 RX ADMIN — DOCUSATE SODIUM 100 MG: 100 CAPSULE, LIQUID FILLED ORAL at 10:45

## 2021-06-08 RX ADMIN — IBUPROFEN 800 MG: 800 TABLET, FILM COATED ORAL at 17:48

## 2021-06-08 RX ADMIN — IBUPROFEN 800 MG: 800 TABLET, FILM COATED ORAL at 03:30

## 2021-06-08 RX ADMIN — DOCUSATE SODIUM 100 MG: 100 CAPSULE, LIQUID FILLED ORAL at 21:56

## 2021-06-08 RX ADMIN — PRENATAL VITAMINS-IRON FUMARATE 27 MG IRON-FOLIC ACID 0.8 MG TABLET 1 TABLET: at 10:45

## 2021-06-08 NOTE — PROGRESS NOTES
Vital:   Vitals:    06/08/21 0745   BP: 112/69   Pulse: 67   Resp: 16   Temp: 98 °F (36.7 °C)   SpO2:    CC postpartum day 1 vaginal delivery.-Doing well without complaints  Lochia scant  Episiotomy: Sutures healing well  Hemoglobin:      Recent Results (from the past 24 hour(s))   CBC (No Diff)    Collection Time: 06/07/21  3:36 PM    Specimen: Blood   Result Value Ref Range    WBC 9.19 3.40 - 10.80 10*3/mm3    RBC 4.26 3.77 - 5.28 10*6/mm3    Hemoglobin 13.4 12.0 - 15.9 g/dL    Hematocrit 38.1 34.0 - 46.6 %    MCV 89.4 79.0 - 97.0 fL    MCH 31.5 26.6 - 33.0 pg    MCHC 35.2 31.5 - 35.7 g/dL    RDW 12.7 12.3 - 15.4 %    RDW-SD 40.8 37.0 - 54.0 fl    MPV 12.0 6.0 - 12.0 fL    Platelets 169 140 - 450 10*3/mm3   Type & Screen    Collection Time: 06/07/21  3:36 PM    Specimen: Blood   Result Value Ref Range    ABO Type O     RH type Positive     Antibody Screen Negative     T&S Expiration Date 6/10/2021 11:59:59 PM    COVID-19,[facility] IN-HOUSE CEPHEID/EDGAR NP SWAB IN TRANSPORT MEDIA 8-12 HR TAT - Swab, Nasopharynx    Collection Time: 06/07/21  3:36 PM    Specimen: Nasopharynx; Swab   Result Value Ref Range    COVID19 Not Detected Not Detected - Ref. Range   Urine Drug Screen - Urine, Clean Catch    Collection Time: 06/07/21  4:31 PM    Specimen: Urine, Clean Catch   Result Value Ref Range    Amphet/Methamphet, Screen Negative Negative    Barbiturates Screen, Urine Negative Negative    Benzodiazepine Screen, Urine Negative Negative    Cocaine Screen, Urine Negative Negative    Opiate Screen Negative Negative    THC, Screen, Urine Negative Negative    Methadone Screen, Urine Negative Negative    Oxycodone Screen, Urine Negative Negative   CBC Auto Differential    Collection Time: 06/08/21  6:38 AM    Specimen: Blood   Result Value Ref Range    WBC 10.52 3.40 - 10.80 10*3/mm3    RBC 3.58 (L) 3.77 - 5.28 10*6/mm3    Hemoglobin 11.2 (L) 12.0 - 15.9 g/dL    Hematocrit 32.6 (L) 34.0 - 46.6 %    MCV 91.1 79.0 - 97.0 fL    MCH  31.3 26.6 - 33.0 pg    MCHC 34.4 31.5 - 35.7 g/dL    RDW 12.9 12.3 - 15.4 %    RDW-SD 42.0 37.0 - 54.0 fl    MPV 12.0 6.0 - 12.0 fL    Platelets 136 (L) 140 - 450 10*3/mm3    Neutrophil % 68.2 42.7 - 76.0 %    Lymphocyte % 20.7 19.6 - 45.3 %    Monocyte % 7.5 5.0 - 12.0 %    Eosinophil % 2.3 0.3 - 6.2 %    Basophil % 0.6 0.0 - 1.5 %    Immature Grans % 0.7 (H) 0.0 - 0.5 %    Neutrophils, Absolute 7.18 (H) 1.70 - 7.00 10*3/mm3    Lymphocytes, Absolute 2.18 0.70 - 3.10 10*3/mm3    Monocytes, Absolute 0.79 0.10 - 0.90 10*3/mm3    Eosinophils, Absolute 0.24 0.00 - 0.40 10*3/mm3    Basophils, Absolute 0.06 0.00 - 0.20 10*3/mm3    Immature Grans, Absolute 0.07 (H) 0.00 - 0.05 10*3/mm3    nRBC 0.0 0.0 - 0.2 /100 WBC     FEMALE BABY    Blood type: O +   Rubella: Immune   Impression  1.  Postpartum day 1 vaginal delivery doing well    Plan  1.  Planning discharge tomorrow.

## 2021-06-08 NOTE — PROGRESS NOTES
Discharge Planning Assessment  [facility]     Patient Name: Rangel Ocampo  MRN: [id number]  Today's Date: 2021    Admit Date: 2021    Discharge Needs Assessment    No documentation.       Discharge Plan     Row Name 21 1316       Plan    Plan  Infant to discharge home with mother when medically ready. CSW will follow for cord toxicology results. SERGIO MurryW    Plan Comments  Mother: Rangel Ocampo, MRN [id number]; Infant: Ever “Amos” Terrence, MRN [id number]. CSW was consulted for “ mother positive THC 21.” Of note, mother and infants urine toxicology screens were negative on admission. Cord toxicology was sent and CSW will follow for results and will report results to CPS if warranted. Of note, mother was positive for THC prenatally on 21.  CSW met with mother at bedside. Father of infant also at bedside, mother declined to speak privately with CSW. Mother verified address, phone and insurance. Mother has spoken with [employer] about adding infant to insurance. Mother reports she has car seat, crib, clothes, diapers and other supplies. Mother reports maternal grandmother and maternal great aunt of infant as primary supports. Mother is not current with [facility], but is familiar with the program. Mother plans on follow up with Pediatric and  specialists and is comfortable scheduling appointments and will have transportation to appointments. CSW educated mother about cord toxicology being sent and that if it comes back positive a CPS report would be made. Mother denies any current THC use. All questions answered. Mother was quiet but cooperative during discussion. CSW also provided a packet of resources and briefly discussed resources in packet. Packet includes info on: WIC, HANDS, infant supplies, post-partum mood and anxiety disorders,  transportation, counseling, online support groups, domestic violence, and general community resources. CSW will follow for cord  toxicology results. CONY Murry        Continued Care and Services - Admitted Since 6/7/2021    Coordination has not been started for this encounter.         Demographic Summary     Row Name 06/08/21 1314       General Information    Admission Type  inpatient    Arrived From  home    Referral Source  nursing    Reason for Consult  substance use concerns;psychosocial concerns    General Information Comments  mother positive THC 1/22/21        Functional Status     Row Name 06/08/21 1315       Mental Status    General Appearance WDL  WDL       Mental Status Summary    Recent Changes in Mental Status/Cognitive Functioning  unable to assess        Psychosocial     Row Name 06/08/21 1315       Behavior WDL    Behavior WDL  WDL       Emotion Mood WDL    Emotion/Mood/Affect WDL  WDL       Speech WDL    Speech WDL  WDL       Perceptual State WDL    Perceptual State WDL  WDL       Thought Process WDL    Thought Process WDL  WDL       Intellectual Performance WDL    Intellectual Performance WDL  WDL       Coping/Stress    Major Change/Loss/Stressor  birth    Patient Personal Strengths  able to adapt;flexibility;future/goal oriented;motivated;positive attitude;resilient;strong support system    Sources of Support  parent(s);other family members;significant other        Abuse/Neglect     Row Name 06/08/21 1315       Personal Safety    Physical Signs of Abuse Present  no        Legal    No documentation.       Substance Abuse     Row Name 06/08/21 1315       Substance Use    Substance Use Status  past street drug/inhalant/medication abuse    Substance Use Comment  Mother and infant with negative urine toxicology screens on admission. Cord toxicology was sent. Mother positive for THC prenatally on 1/22/21        Patient Forms    No documentation.           REY Murry

## 2021-06-08 NOTE — PLAN OF CARE
Goal Outcome Evaluation:  Plan of Care Reviewed With: patient        Progress: improving  Outcome Summary: VSS.  Pt ambulating and voiding.  Pain controlled w/motrin. Bleeding WNL.  Uterus is firm, midline, -2.

## 2021-06-09 VITALS
WEIGHT: 136 LBS | HEART RATE: 77 BPM | TEMPERATURE: 98.1 F | SYSTOLIC BLOOD PRESSURE: 114 MMHG | OXYGEN SATURATION: 99 % | DIASTOLIC BLOOD PRESSURE: 62 MMHG | BODY MASS INDEX: 21.86 KG/M2 | HEIGHT: 66 IN | RESPIRATION RATE: 16 BRPM

## 2021-06-09 PROCEDURE — 0503F POSTPARTUM CARE VISIT: CPT | Performed by: OBSTETRICS & GYNECOLOGY

## 2021-06-09 RX ORDER — IBUPROFEN 800 MG/1
800 TABLET ORAL EVERY 8 HOURS PRN
Qty: 30 TABLET | Refills: 0 | Status: SHIPPED | OUTPATIENT
Start: 2021-06-09

## 2021-06-09 RX ORDER — HYDROCODONE BITARTRATE AND ACETAMINOPHEN 5; 325 MG/1; MG/1
1 TABLET ORAL EVERY 6 HOURS PRN
Qty: 16 TABLET | Refills: 0 | Status: SHIPPED | OUTPATIENT
Start: 2021-06-09

## 2021-06-09 RX ORDER — PSEUDOEPHEDRINE HCL 30 MG
100 TABLET ORAL 2 TIMES DAILY
Qty: 20 CAPSULE | Refills: 0 | Status: SHIPPED | OUTPATIENT
Start: 2021-06-09

## 2021-06-09 RX ADMIN — IBUPROFEN 800 MG: 800 TABLET, FILM COATED ORAL at 01:35

## 2021-06-09 NOTE — DISCHARGE SUMMARY
Date of Discharge:  2021    Discharge Diagnosis: Intrauterine pregnancy at 38-2/7 weeks, delivered    Presenting Problem/History of Present Illness  Pregnancy [Z34.90]       Hospital Course  Patient is a 20 y.o. female presented with active labor at 38-2/7 weeks.  The patient progressed to spontaneous vaginal delivery of a vigorous female .  For event surrounding delivery, please see the delivery note.  Postpartum course was smooth.  By postpartum day #2, the patient was afebrile, tolerating a regular diet and her lochia was minimal.  At this point, the patient requested discharge home and I felt that she was stable for this..      Procedures Performed         Consults:   Consults     No orders found from 2021 to 2021.              Condition on Discharge: Stable    Vital Signs  Temp:  [98 °F (36.7 °C)-98.4 °F (36.9 °C)] 98.1 °F (36.7 °C)  Heart Rate:  [74-77] 77  Resp:  [16-17] 16  BP: (104-114)/(61-62) 114/62    Physical Exam:   The abdomen is soft and nondistended.  Fundus is firm and nontender.  It is below the umbilicus.  Extremities are equal in size with minimal pedal edema    Discharge Disposition  Home or Self Care    Discharge Medications     Discharge Medications      New Medications      Instructions Start Date   docusate sodium 100 MG capsule   100 mg, Oral, 2 Times Daily      HYDROcodone-acetaminophen 5-325 MG per tablet  Commonly known as: NORCO   1 tablet, Oral, Every 6 Hours PRN      ibuprofen 800 MG tablet  Commonly known as: ADVIL,MOTRIN   800 mg, Oral, Every 8 Hours PRN         Continue These Medications      Instructions Start Date   prenatal vitamin 28-0.8 28-0.8 MG tablet tablet   1 tablet, Oral, Daily             Discharge Diet:     Activity at Discharge:     Follow-up Appointments  No future appointments.  Additional Instructions for the Follow-ups that You Need to Schedule     Call MD With Problems / Concerns   As directed      Instructions: Call for fever, severe  abdominal pain, heavy bleeding or any other concerns    Order Comments: Instructions: Call for fever, severe abdominal pain, heavy bleeding or any other concerns          Discharge Follow-up with Specified Provider: Dr Oden; 6 Weeks   As directed      To: Dr Oden    Follow Up: 6 Weeks               Test Results Pending at Discharge       Sagar Aj MD  06/09/21  09:32 EDT    Time: Discharge 20 min

## 2021-06-09 NOTE — PLAN OF CARE
Goal Outcome Evaluation:              Outcome Summary: VSS, pain well controlled, ambulating well, bottle feeding baby, d/c today

## 2021-06-14 NOTE — PROGRESS NOTES
Continued Stay Note  [facility]     Patient Name: Rangel Ocampo  MRN: [id number]  Today's Date: 6/14/2021    Admit Date: 6/7/2021    Discharge Plan     Row Name 06/14/21 1242       Plan    Plan Comments  Mother: Rangel Ocampo, MRN [id number]; Infant: Amos Burgos, MRN [id number].. CSW reviewed cord toxicology results, which are negative. Referral to CPS is not warranted at this time. CONY Murry        Discharge Codes    No documentation.       Expected Discharge Date and Time     Expected Discharge Date Expected Discharge Time    Jun 9, 2021             REY Murry

## 2021-08-09 ENCOUNTER — TELEPHONE (OUTPATIENT)
Dept: OBSTETRICS AND GYNECOLOGY | Facility: CLINIC | Age: 21
End: 2021-08-09

## 2021-09-23 ENCOUNTER — TELEPHONE (OUTPATIENT)
Dept: OBSTETRICS AND GYNECOLOGY | Facility: CLINIC | Age: 21
End: 2021-09-23